# Patient Record
Sex: MALE | URBAN - METROPOLITAN AREA
[De-identification: names, ages, dates, MRNs, and addresses within clinical notes are randomized per-mention and may not be internally consistent; named-entity substitution may affect disease eponyms.]

---

## 2023-12-11 ENCOUNTER — HOSPITAL ENCOUNTER (EMERGENCY)
Facility: MEDICAL CENTER | Age: 18
End: 2023-12-11
Attending: EMERGENCY MEDICINE

## 2023-12-11 VITALS
OXYGEN SATURATION: 97 % | HEART RATE: 65 BPM | BODY MASS INDEX: 18.24 KG/M2 | TEMPERATURE: 97 F | SYSTOLIC BLOOD PRESSURE: 111 MMHG | RESPIRATION RATE: 18 BRPM | WEIGHT: 120.37 LBS | HEIGHT: 68 IN | DIASTOLIC BLOOD PRESSURE: 73 MMHG

## 2023-12-11 DIAGNOSIS — J06.9 VIRAL UPPER RESPIRATORY ILLNESS: ICD-10-CM

## 2023-12-11 PROCEDURE — 99281 EMR DPT VST MAYX REQ PHY/QHP: CPT

## 2023-12-11 ASSESSMENT — PAIN DESCRIPTION - PAIN TYPE: TYPE: ACUTE PAIN

## 2023-12-11 NOTE — Clinical Note
Lamonte Bhagat was seen and treated in our emergency department on 12/11/2023.  He may return to work on 12/11/2023.  Sick for last 2 days but able to go back to work today without restrictions per Dr. Agustin     If you have any questions or concerns, please don't hesitate to call.      Steve Agustin M.D.

## 2023-12-11 NOTE — ED TRIAGE NOTES
"Chief Complaint   Patient presents with    Other     Pt is requesting a work note for last Friday and Saturday. States was \"not feeling well\". Reports having fevers and chills and a headache. States sx have resolved for the most part. Wondering if he can go back to work.      Pt ambulates to triage for above.     Pt to nubia, educated on triage process, thanked for patience.   "

## 2023-12-11 NOTE — ED PROVIDER NOTES
"  ER Provider Note    Scribed for Steve Agustin M.d. by Araceli Morris. 12/11/2023  10:12 AM    Primary Care Provider: No primary care provider stated.    CHIEF COMPLAINT  Chief Complaint   Patient presents with    Other     Pt is requesting a work note for last Friday and Saturday. States was \"not feeling well\". Reports having fevers and chills and a headache. States sx have resolved for the most part. Wondering if he can go back to work.      HPI/ROS  LIMITATION TO HISTORY   Select: : None  OUTSIDE HISTORIAN(S):  None    Lamonte Rao Bhagat is a 18 y.o. male who presents to the ED for a work note. The patient reports that he is presenting to the ED today for a work note. He explains that he was out of work last Friday and Saturday because he was not feeling well. He notes that he had a sore throat but he no longer has any symptoms. He would like to know if he is okay to return to work today.     PAST MEDICAL HISTORY  History reviewed. No pertinent past medical history.    SURGICAL HISTORY  History reviewed. No pertinent surgical history.    FAMILY HISTORY  History reviewed. No pertinent family history.    SOCIAL HISTORY   reports that he has been smoking cigarettes. He has never used smokeless tobacco. He reports current alcohol use. He reports that he does not currently use drugs.    CURRENT MEDICATIONS  No current outpatient medications     ALLERGIES  Patient has no known allergies.    PHYSICAL EXAM  /73   Pulse 65   Temp 36.1 °C (97 °F) (Temporal)   Resp 18   Ht 1.727 m (5' 8\")   Wt 54.6 kg (120 lb 5.9 oz)   SpO2 97%   BMI 18.30 kg/m²     Constitutional: Well developed, Well nourished, No acute distress, Non-toxic appearance.   HENT: Normocephalic, Atraumatic, Bilateral external ears normal, Oropharynx is clear mucous membranes are moist. No oral exudates or nasal discharge.   Eyes: Pupils are equal round and reactive, EOMI, Conjunctiva normal, No discharge.   Neck: Normal range of motion, No " tenderness, Supple, No stridor. No meningismus.  Lymphatic: No lymphadenopathy noted.   Cardiovascular: Regular rate and rhythm without murmur rub or gallop.  Thorax & Lungs: Clear breath sounds bilaterally without wheezes, rhonchi or rales. There is no chest wall tenderness.   Abdomen: Soft non-tender non-distended. There is no rebound or guarding. No organomegaly is appreciated. Bowel sounds are normal.  Skin: Normal without rash.   Back: No CVA or spinal tenderness.   Extremities: Intact distal pulses, No edema, No tenderness, No cyanosis, No clubbing. Capillary refill is less than 2 seconds.  Musculoskeletal: Good range of motion in all major joints. No tenderness to palpation or major deformities noted.   Neurologic: Alert & oriented x 3, Normal motor function, Normal sensory function, No focal deficits noted. Reflexes are normal.  Psychiatric: Affect normal, Judgment normal, Mood normal. There is no suicidal ideation or patient reported hallucinations.      COURSE & MEDICAL DECISION MAKING     ED Observation Status? No; Patient does not meet criteria for ED Observation.     INITIAL ASSESSMENT, COURSE AND PLAN  Care Narrative:     10:18 AM - Patient seen and examined and appears to have remnants of a viral upper respiratory process and is at bedside for request of a work note for last weekend. The patient no longer has a sore throat. I informed him that he is okay to return to work. Discussed plan for discharge. Patient was given the opportunity for questions, and I addressed all questions or concerns. He is stable for discharge at this time. Patient verbalizes understanding and support with my plan for discharge.       DISPOSITION AND DISCUSSIONS  I have discussed management of the patient with the following physicians and BRIAN's:  None    Discussion of management with other Q[facility] or appropriate source(s): None     Barriers to care at this time, including but not limited to: Patient does not have established PCP.      Decision tools and prescription drugs considered including, but not limited to: Antibiotics we will avoid antibiotics given high likelihood of viral process .    The patient will return for new or worsening symptoms and is stable at the time of discharge.    The patient is referred to a primary physician for blood pressure management, diabetic screening, and for all other preventative health concerns.    DISPOSITION:  Patient will be discharged home in stable condition.    FOLLOW UP:  No follow-up provider specified.    FINAL DIAGNOSIS  1. Viral upper respiratory illness       Araceli GILBERT (Patt), am scribing for, and in the presence of, Steve Agustin M.D..    Electronically signed by: Araceli Morris (Patt), 12/11/2023    Steve GILBERT M.D. personally performed the services described in this documentation, as scribed by Araceli Morris in my presence, and it is both accurate and complete.      The note accurately reflects work and decisions made by me.  Steve Agustin M.D.  12/11/2023  12:22 PM